# Patient Record
Sex: FEMALE | HISPANIC OR LATINO | Employment: FULL TIME | URBAN - METROPOLITAN AREA
[De-identification: names, ages, dates, MRNs, and addresses within clinical notes are randomized per-mention and may not be internally consistent; named-entity substitution may affect disease eponyms.]

---

## 2023-09-08 ENCOUNTER — HOSPITAL ENCOUNTER (EMERGENCY)
Facility: CLINIC | Age: 58
Discharge: HOME | End: 2023-09-08
Attending: EMERGENCY MEDICINE | Admitting: EMERGENCY MEDICINE
Payer: COMMERCIAL

## 2023-09-08 VITALS
DIASTOLIC BLOOD PRESSURE: 128 MMHG | HEART RATE: 77 BPM | RESPIRATION RATE: 20 BRPM | OXYGEN SATURATION: 98 % | SYSTOLIC BLOOD PRESSURE: 181 MMHG | TEMPERATURE: 97.6 F

## 2023-09-08 DIAGNOSIS — L03.116 CELLULITIS OF LEFT LOWER LEG: ICD-10-CM

## 2023-09-08 LAB
ANION GAP SERPL CALCULATED.3IONS-SCNC: 11 MMOL/L (ref 7–15)
BASOPHILS # BLD AUTO: 0 10E3/UL (ref 0–0.2)
BASOPHILS NFR BLD AUTO: 0 %
BUN SERPL-MCNC: 36 MG/DL (ref 6–20)
CALCIUM SERPL-MCNC: 9.1 MG/DL (ref 8.6–10)
CHLORIDE SERPL-SCNC: 100 MMOL/L (ref 98–107)
CREAT SERPL-MCNC: 1.49 MG/DL (ref 0.51–0.95)
DEPRECATED HCO3 PLAS-SCNC: 23 MMOL/L (ref 22–29)
EGFRCR SERPLBLD CKD-EPI 2021: 40 ML/MIN/1.73M2
EOSINOPHIL # BLD AUTO: 0.2 10E3/UL (ref 0–0.7)
EOSINOPHIL NFR BLD AUTO: 3 %
ERYTHROCYTE [DISTWIDTH] IN BLOOD BY AUTOMATED COUNT: 13.6 % (ref 10–15)
GLUCOSE SERPL-MCNC: 270 MG/DL (ref 70–99)
HCT VFR BLD AUTO: 40 % (ref 35–47)
HGB BLD-MCNC: 13 G/DL (ref 11.7–15.7)
IMM GRANULOCYTES # BLD: 0 10E3/UL
IMM GRANULOCYTES NFR BLD: 0 %
LYMPHOCYTES # BLD AUTO: 2.7 10E3/UL (ref 0.8–5.3)
LYMPHOCYTES NFR BLD AUTO: 34 %
MCH RBC QN AUTO: 28.1 PG (ref 26.5–33)
MCHC RBC AUTO-ENTMCNC: 32.5 G/DL (ref 31.5–36.5)
MCV RBC AUTO: 87 FL (ref 78–100)
MONOCYTES # BLD AUTO: 0.6 10E3/UL (ref 0–1.3)
MONOCYTES NFR BLD AUTO: 8 %
NEUTROPHILS # BLD AUTO: 4.3 10E3/UL (ref 1.6–8.3)
NEUTROPHILS NFR BLD AUTO: 55 %
NRBC # BLD AUTO: 0 10E3/UL
NRBC BLD AUTO-RTO: 0 /100
PLATELET # BLD AUTO: 264 10E3/UL (ref 150–450)
POTASSIUM SERPL-SCNC: 4.6 MMOL/L (ref 3.4–5.3)
RBC # BLD AUTO: 4.62 10E6/UL (ref 3.8–5.2)
SODIUM SERPL-SCNC: 134 MMOL/L (ref 136–145)
WBC # BLD AUTO: 8 10E3/UL (ref 4–11)

## 2023-09-08 PROCEDURE — 36415 COLL VENOUS BLD VENIPUNCTURE: CPT | Performed by: EMERGENCY MEDICINE

## 2023-09-08 PROCEDURE — 99284 EMERGENCY DEPT VISIT MOD MDM: CPT

## 2023-09-08 PROCEDURE — 85025 COMPLETE CBC W/AUTO DIFF WBC: CPT | Performed by: EMERGENCY MEDICINE

## 2023-09-08 PROCEDURE — 93005 ELECTROCARDIOGRAM TRACING: CPT

## 2023-09-08 PROCEDURE — 80048 BASIC METABOLIC PNL TOTAL CA: CPT | Performed by: EMERGENCY MEDICINE

## 2023-09-08 PROCEDURE — 250N000013 HC RX MED GY IP 250 OP 250 PS 637: Performed by: EMERGENCY MEDICINE

## 2023-09-08 RX ORDER — FUROSEMIDE 40 MG
40 TABLET ORAL ONCE
Status: COMPLETED | OUTPATIENT
Start: 2023-09-08 | End: 2023-09-08

## 2023-09-08 RX ORDER — FUROSEMIDE 20 MG
40 TABLET ORAL DAILY
Qty: 8 TABLET | Refills: 0 | Status: SHIPPED | OUTPATIENT
Start: 2023-09-08 | End: 2023-09-12

## 2023-09-08 RX ORDER — CEPHALEXIN 500 MG/1
500 CAPSULE ORAL 4 TIMES DAILY
Qty: 40 CAPSULE | Refills: 0 | Status: SHIPPED | OUTPATIENT
Start: 2023-09-08 | End: 2023-09-18

## 2023-09-08 RX ORDER — CEPHALEXIN 500 MG/1
500 CAPSULE ORAL ONCE
Status: COMPLETED | OUTPATIENT
Start: 2023-09-08 | End: 2023-09-08

## 2023-09-08 RX ADMIN — FUROSEMIDE 40 MG: 40 TABLET ORAL at 23:10

## 2023-09-08 RX ADMIN — CEPHALEXIN 500 MG: 500 CAPSULE ORAL at 23:10

## 2023-09-08 ASSESSMENT — ACTIVITIES OF DAILY LIVING (ADL): ADLS_ACUITY_SCORE: 33

## 2023-09-09 NOTE — ED PROVIDER NOTES
History     Chief Complaint:  Leg Swelling       HPI   Shana Rashid is a 58 year old female with a history of hypertension and diabetes who presents to the ED today with right lower leg pain and swelling. Patient reports that she has been having redness and swelling for 4 days over her left ankle which she at first believed to be a bug bite. Daughter also reports that she works sitting down which makes her leg swelling worse.  Denies history of leg infection, DVT history, chest pain, shortness of breath, history of stress test, fever.     Patient has history of hypertension.  She states that blood pressure is typically well controlled.  She has been taking her blood pressure medications as prescribed although has not taken her evening dose secondary to being in the emergency department.  Recent medication changes.  Patient states that she has had some chronic shortness of breath that occurs when she is climbing quite a few stairs.  However this is not new or progressive.  She has not had any chest pain or shortness of breath with walking her dog.  Incidentally, she denies chest pain or shortness of breath today.  No history of PE or DVT.    Independent Historian:   Daughter supplements as above and helps translate.  also used.    Review of External Notes:   None    Medications:    Losartan   Carvedilol  Jardiance      Past Medical History:    Hypertension   Diabetes     Past Surgical History:    Reproductive surgery   Ureteral stent placement   Cystoscopy with retrogrades rigid     Physical Exam   Patient Vitals for the past 24 hrs:   BP Temp Temp src Pulse Resp SpO2   09/08/23 2245 (!) 181/128 -- -- 77 20 98 %   09/08/23 2003 (!) 182/110 97.6  F (36.4  C) Temporal 77 18 97 %      Physical Exam    Gen: alert  HEENT: PERRL, oropharynx clear  Neck: normal ROM  CV: RRR, no murmurs, 2+ distal pulses in all 4 extremities  Chest: no tenderness over the chest wall  Pulm: breath sounds equal,  lungs clear  Abd: Soft, nontender  Back: no evidence of injury  MSK: no lower extremity edema, no calf tenderness  Skin: There is open area of scabbing to the proximal left lower leg, lower left shin red warm and tender consistent with cellulitis  Neuro: alert, appropriate conversation and interaction      Emergency Department Course   ECG  ECG taken at 2234, ECG read at 2236  Poor data quality, interpretation may be adversely affected  Normal sinus rhythm   Moderate voltage criteria for LVH, may be normal variant   Nonspecific T wave abnormality   Abnormal ECG    Rate 77 bpm. VA interval 156 ms. QRS duration 80 ms. QT/QTc 372/420 ms. P-R-T axes 55 -18 85.     Laboratory:  Labs Ordered and Resulted from Time of ED Arrival to Time of ED Departure   BASIC METABOLIC PANEL - Abnormal       Result Value    Sodium 134 (*)     Potassium 4.6      Chloride 100      Carbon Dioxide (CO2) 23      Anion Gap 11      Urea Nitrogen 36.0 (*)     Creatinine 1.49 (*)     Calcium 9.1      Glucose 270 (*)     GFR Estimate 40 (*)    CBC WITH PLATELETS AND DIFFERENTIAL    WBC Count 8.0      RBC Count 4.62      Hemoglobin 13.0      Hematocrit 40.0      MCV 87      MCH 28.1      MCHC 32.5      RDW 13.6      Platelet Count 264      % Neutrophils 55      % Lymphocytes 34      % Monocytes 8      % Eosinophils 3      % Basophils 0      % Immature Granulocytes 0      NRBCs per 100 WBC 0      Absolute Neutrophils 4.3      Absolute Lymphocytes 2.7      Absolute Monocytes 0.6      Absolute Eosinophils 0.2      Absolute Basophils 0.0      Absolute Immature Granulocytes 0.0      Absolute NRBCs 0.0      Emergency Department Course & Assessments:       Interventions:  Medications   furosemide (LASIX) tablet 40 mg (40 mg Oral $Given 9/8/23 2310)   cephALEXin (KEFLEX) capsule 500 mg (500 mg Oral $Given 9/8/23 2310)     Assessments:  2315 I obtained history and examined the patient as noted above.     Independent Interpretation (X-rays, CTs, rhythm  strip):  None    Consultations/Discussion of Management or Tests:  None        Social Determinants of Health affecting care:   None    Disposition:  The patient was discharged to home.     Impression & Plan    CMS Diagnoses: None    Medical Decision Making:  Patient presents for left lower extremity pain redness and swelling.  Exam is consistent with cellulitis.  There is clear wound of the proximal leg that was likely the initial nidus of infection.  No foot wound.  No fever.  I discussed with the patient the serious nature of cellulitis and diabetic patient.  We will treat with cephalexin.  She has no prior history of resistant skin infection.  Patient also with quite elevated blood pressure today.  Patient has a history of hypertension.  No overt evidence of new hypertensive emergency.  Patient does follow with nephrology for history of kidney dysfunction.  No chest pain or shortness of breath.  She notes that she has some chronic bilateral lower extremity edema and this seems to be a bit worse than baseline.  Currently, more swollen than her leg with cellulitis.  Given swelling as well as elevated blood pressure, will do 5-day burst of Lasix.  Elevate legs.  Take Keflex as prescribed.  Signs and symptoms for which return the emergency department were discussed.  Discharge home.  Close outpatient primary care follow-up to reassess blood pressure.  And follow-up cellulitis.  Discharge home    Diagnosis:    ICD-10-CM    1. Cellulitis of left lower leg  L03.116          Discharge Medications:  Discharge Medication List as of 9/8/2023 11:12 PM        START taking these medications    Details   cephALEXin (KEFLEX) 500 MG capsule Take 1 capsule (500 mg) by mouth 4 times daily for 10 days, Disp-40 capsule, R-0, E-Prescribe      furosemide (LASIX) 20 MG tablet Take 2 tablets (40 mg) by mouth daily for 4 days, Disp-8 tablet, R-0, E-Prescribe            Scribe Disclosure:  Josh VELAZQUEZ, am serving as a scribe at 12:03  AM on 9/9/2023 to document services personally performed by Janae Chavarria MD based on my observations and the provider's statements to me.     9/8/2023   Janae Chavarria MD Trussell, Kristi Jo Schneider, MD  09/09/23 0712

## 2023-09-09 NOTE — DISCHARGE INSTRUCTIONS
Elevated legs as much as possible.  Drink plenty of fluids.  Take antibiotics as directed.  Take lasix (water pill) as directed.

## 2023-09-09 NOTE — ED TRIAGE NOTES
Pt presents to the ED with left lower leg swelling and redness. Pt states it started 4 days ago. Pt HTN in triage 182/110.      Triage Assessment       Row Name 09/08/23 2004       Triage Assessment (Adult)    Airway WDL WDL       Respiratory WDL    Respiratory WDL WDL       Skin Circulation/Temperature WDL    Skin Circulation/Temperature WDL X  Left lower leg redness and swelling       Cardiac WDL    Cardiac WDL X  HTN       Peripheral/Neurovascular WDL    Peripheral Neurovascular WDL WDL       Cognitive/Neuro/Behavioral WDL    Cognitive/Neuro/Behavioral WDL WDL

## 2023-09-11 LAB
ATRIAL RATE - MUSE: 77 BPM
DIASTOLIC BLOOD PRESSURE - MUSE: NORMAL MMHG
INTERPRETATION ECG - MUSE: NORMAL
P AXIS - MUSE: 55 DEGREES
PR INTERVAL - MUSE: 156 MS
QRS DURATION - MUSE: 80 MS
QT - MUSE: 372 MS
QTC - MUSE: 420 MS
R AXIS - MUSE: -18 DEGREES
SYSTOLIC BLOOD PRESSURE - MUSE: NORMAL MMHG
T AXIS - MUSE: 85 DEGREES
VENTRICULAR RATE- MUSE: 77 BPM

## 2024-11-29 ENCOUNTER — HOSPITAL ENCOUNTER (EMERGENCY)
Facility: CLINIC | Age: 59
Discharge: HOME | End: 2024-11-29
Attending: EMERGENCY MEDICINE | Admitting: EMERGENCY MEDICINE
Payer: COMMERCIAL

## 2024-11-29 VITALS
HEART RATE: 68 BPM | TEMPERATURE: 96.8 F | HEIGHT: 61 IN | SYSTOLIC BLOOD PRESSURE: 138 MMHG | OXYGEN SATURATION: 98 % | WEIGHT: 213.41 LBS | RESPIRATION RATE: 16 BRPM | BODY MASS INDEX: 40.29 KG/M2 | DIASTOLIC BLOOD PRESSURE: 68 MMHG

## 2024-11-29 DIAGNOSIS — M86.9 FINGER OSTEOMYELITIS, RIGHT (H): ICD-10-CM

## 2024-11-29 LAB
ANION GAP SERPL CALCULATED.3IONS-SCNC: 14 MMOL/L (ref 7–15)
BASOPHILS # BLD AUTO: 0 10E3/UL (ref 0–0.2)
BASOPHILS NFR BLD AUTO: 0 %
BUN SERPL-MCNC: 42 MG/DL (ref 8–23)
CALCIUM SERPL-MCNC: 8.2 MG/DL (ref 8.8–10.4)
CHLORIDE SERPL-SCNC: 106 MMOL/L (ref 98–107)
CREAT SERPL-MCNC: 1.5 MG/DL (ref 0.51–0.95)
CRP SERPL-MCNC: 7.86 MG/L
EGFRCR SERPLBLD CKD-EPI 2021: 40 ML/MIN/1.73M2
EOSINOPHIL # BLD AUTO: 0.3 10E3/UL (ref 0–0.7)
EOSINOPHIL NFR BLD AUTO: 4 %
ERYTHROCYTE [DISTWIDTH] IN BLOOD BY AUTOMATED COUNT: 14.1 % (ref 10–15)
ERYTHROCYTE [SEDIMENTATION RATE] IN BLOOD BY WESTERGREN METHOD: 34 MM/HR (ref 0–30)
GLUCOSE SERPL-MCNC: 140 MG/DL (ref 70–99)
HCO3 SERPL-SCNC: 18 MMOL/L (ref 22–29)
HCT VFR BLD AUTO: 38.7 % (ref 35–47)
HGB BLD-MCNC: 12.4 G/DL (ref 11.7–15.7)
HOLD SPECIMEN: NORMAL
HOLD SPECIMEN: NORMAL
IMM GRANULOCYTES # BLD: 0 10E3/UL
IMM GRANULOCYTES NFR BLD: 0 %
LYMPHOCYTES # BLD AUTO: 2.7 10E3/UL (ref 0.8–5.3)
LYMPHOCYTES NFR BLD AUTO: 31 %
MCH RBC QN AUTO: 28.1 PG (ref 26.5–33)
MCHC RBC AUTO-ENTMCNC: 32 G/DL (ref 31.5–36.5)
MCV RBC AUTO: 88 FL (ref 78–100)
MONOCYTES # BLD AUTO: 0.6 10E3/UL (ref 0–1.3)
MONOCYTES NFR BLD AUTO: 6 %
NEUTROPHILS # BLD AUTO: 5.1 10E3/UL (ref 1.6–8.3)
NEUTROPHILS NFR BLD AUTO: 59 %
NRBC # BLD AUTO: 0 10E3/UL
NRBC BLD AUTO-RTO: 0 /100
PLATELET # BLD AUTO: 267 10E3/UL (ref 150–450)
POTASSIUM SERPL-SCNC: 4.3 MMOL/L (ref 3.4–5.3)
RBC # BLD AUTO: 4.42 10E6/UL (ref 3.8–5.2)
SODIUM SERPL-SCNC: 138 MMOL/L (ref 135–145)
WBC # BLD AUTO: 8.7 10E3/UL (ref 4–11)

## 2024-11-29 PROCEDURE — 250N000011 HC RX IP 250 OP 636: Mod: JZ | Performed by: EMERGENCY MEDICINE

## 2024-11-29 PROCEDURE — 36415 COLL VENOUS BLD VENIPUNCTURE: CPT | Performed by: EMERGENCY MEDICINE

## 2024-11-29 PROCEDURE — 85018 HEMOGLOBIN: CPT | Performed by: EMERGENCY MEDICINE

## 2024-11-29 PROCEDURE — 99284 EMERGENCY DEPT VISIT MOD MDM: CPT | Mod: 25

## 2024-11-29 PROCEDURE — 85652 RBC SED RATE AUTOMATED: CPT | Performed by: EMERGENCY MEDICINE

## 2024-11-29 PROCEDURE — 85004 AUTOMATED DIFF WBC COUNT: CPT | Performed by: EMERGENCY MEDICINE

## 2024-11-29 PROCEDURE — 85041 AUTOMATED RBC COUNT: CPT | Performed by: EMERGENCY MEDICINE

## 2024-11-29 PROCEDURE — 80048 BASIC METABOLIC PNL TOTAL CA: CPT | Performed by: EMERGENCY MEDICINE

## 2024-11-29 PROCEDURE — 96365 THER/PROPH/DIAG IV INF INIT: CPT

## 2024-11-29 PROCEDURE — 84132 ASSAY OF SERUM POTASSIUM: CPT | Performed by: EMERGENCY MEDICINE

## 2024-11-29 PROCEDURE — 86140 C-REACTIVE PROTEIN: CPT | Performed by: EMERGENCY MEDICINE

## 2024-11-29 RX ORDER — CLINDAMYCIN HYDROCHLORIDE 300 MG/1
300 CAPSULE ORAL 4 TIMES DAILY
Qty: 40 CAPSULE | Refills: 0 | Status: SHIPPED | OUTPATIENT
Start: 2024-11-29 | End: 2024-12-09

## 2024-11-29 RX ORDER — CLINDAMYCIN PHOSPHATE 900 MG/50ML
900 INJECTION, SOLUTION INTRAVENOUS EVERY 8 HOURS
Status: DISCONTINUED | OUTPATIENT
Start: 2024-11-29 | End: 2024-11-30 | Stop reason: HOSPADM

## 2024-11-29 RX ADMIN — CLINDAMYCIN PHOSPHATE 900 MG: 900 INJECTION, SOLUTION INTRAVENOUS at 22:41

## 2024-11-29 ASSESSMENT — COLUMBIA-SUICIDE SEVERITY RATING SCALE - C-SSRS
6. HAVE YOU EVER DONE ANYTHING, STARTED TO DO ANYTHING, OR PREPARED TO DO ANYTHING TO END YOUR LIFE?: NO
2. HAVE YOU ACTUALLY HAD ANY THOUGHTS OF KILLING YOURSELF IN THE PAST MONTH?: NO
1. IN THE PAST MONTH, HAVE YOU WISHED YOU WERE DEAD OR WISHED YOU COULD GO TO SLEEP AND NOT WAKE UP?: NO

## 2024-11-29 ASSESSMENT — ACTIVITIES OF DAILY LIVING (ADL)
ADLS_ACUITY_SCORE: 41

## 2024-11-30 NOTE — ED PROVIDER NOTES
"  Emergency Department Note      History of Present Illness     Chief Complaint   Wound Check        HPI   Shana Rashid is a 59 year old female with a history of diabetes type 2, hypertension, and CKD stage 3 who presents to the ER for wound check. Patient reports 2 months ago she burned the tip of her right 2nd finger.  It just never healed.  No drainage bleeding or pain.  Full function intact.  Just was worried about it so decided to get it checked.  Went to UC XR done and sent here.      Independent Historian   Family    Review of External Notes   UC and XR report    Past Medical History     Medical History and Problem List   Diabetes type 2   Myopia, bilateral   Osteoarthritis   CKD stage 3   Hypertension     Medications   Furosemide   Amlodipine   Atorvastatin   Carvedilol   Jardiance   Losartan     Surgical History   Tubal ligation  Ureteral stent placed   Cystoscopy  Physical Exam     Patient Vitals for the past 24 hrs:   BP Temp Temp src Pulse Resp SpO2 Height Weight   11/29/24 2352 138/68 -- -- 68 -- 98 % 1.549 m (5' 1\") --   11/29/24 2029 (!) 143/81 96.8  F (36  C) Temporal 74 16 100 % -- 96.8 kg (213 lb 6.5 oz)     Physical Exam  General: Patient is well appearing. No distress.  Head: Atraumatic.  Eyes: Conjunctivae and EOM are normal. No scleral icterus.  Neck: Normal range of motion. Neck supple.   Cardiovascular: Normal rate, regular rhythm, normal heart sounds and intact distal pulses.   Pulmonary/Chest: Breath sounds normal. No respiratory distress.  Abdominal: Soft. Bowel sounds are normal. No distension. No tenderness. No rebound or guarding.   Musculoskeletal: Normal range of motion.  Skin: Right 2nd finger distal tuft skin breakdown.  No bleeding or drainage.  Full function intact painlessly.     Diagnostics     Lab Results   Labs Ordered and Resulted from Time of ED Arrival to Time of ED Departure   CRP INFLAMMATION - Abnormal       Result Value    CRP Inflammation 7.86 (*)  "   ERYTHROCYTE SEDIMENTATION RATE AUTO - Abnormal    Erythrocyte Sedimentation Rate 34 (*)    BASIC METABOLIC PANEL - Abnormal    Sodium 138      Potassium 4.3      Chloride 106      Carbon Dioxide (CO2) 18 (*)     Anion Gap 14      Urea Nitrogen 42.0 (*)     Creatinine 1.50 (*)     GFR Estimate 40 (*)     Calcium 8.2 (*)     Glucose 140 (*)    CBC WITH PLATELETS AND DIFFERENTIAL    WBC Count 8.7      RBC Count 4.42      Hemoglobin 12.4      Hematocrit 38.7      MCV 88      MCH 28.1      MCHC 32.0      RDW 14.1      Platelet Count 267      % Neutrophils 59      % Lymphocytes 31      % Monocytes 6      % Eosinophils 4      % Basophils 0      % Immature Granulocytes 0      NRBCs per 100 WBC 0      Absolute Neutrophils 5.1      Absolute Lymphocytes 2.7      Absolute Monocytes 0.6      Absolute Eosinophils 0.3      Absolute Basophils 0.0      Absolute Immature Granulocytes 0.0      Absolute NRBCs 0.0         Imaging   No orders to display     Independent Interpretation   Right hand XR from clinic later loaded onto PACS and appreciate distal tuft erosion 2nd finger.      ED Course      Medications Administered   Medications - No data to display      Discussion of Management   Orthopedics on call for hand.      ED Course   ED Course as of 11/30/24 0224 Fri Nov 29, 2024 2202 I obtained the history and examined the patient as noted above.    2310 I rechecked patient and explained findings and plan of care.     2332 I rechecked patient and explained findings and plan of care.         Additional Documentation  None    Medical Decision Making / Diagnosis         JOHN   Shaanevelin Michael Rachid is a 59 year old female with now 2 months of nonhealing finger tip without acute redness swelling drainage or loss of function.  There was really no acute reasoning for tonight other than just timing of coming in.  We discussed need for admission IV abx and consultation she rather would not and would like to go home.  I discussed this  with on call ortho for hand and because now chronic not actively showing surgical signs they agreed to see in clinic early this coming week we will be sending home on abx and wound care instructed.  She understands some risk with this and return instructions.      Disposition   The patient was discharged.     Diagnosis     ICD-10-CM    1. Finger osteomyelitis, right (H)  M86.9            Discharge Medications   Discharge Medication List as of 11/29/2024 11:46 PM        START taking these medications    Details   clindamycin (CLEOCIN) 300 MG capsule Take 1 capsule (300 mg) by mouth 4 times daily for 10 days., Disp-40 capsule, R-0, Local Print               Scribe Disclosure:  I, Mihir Lawton, am serving as a scribe at 8:55 PM on 11/29/2024 to document services personally performed by Antonio Gardiner MD based on my observations and the provider's statements to me.        Antonio Gardiner MD  11/30/24 0227

## 2024-11-30 NOTE — ED TRIAGE NOTES
Burn to right pointer finger that occurred 2 months ago. Ongoing wound to the tip of her finger. XR completed today that shows possible osteomyelitis. Sent to ER for eval.

## 2025-02-10 ENCOUNTER — LAB REQUISITION (OUTPATIENT)
Dept: LAB | Facility: CLINIC | Age: 60
End: 2025-02-10
Payer: COMMERCIAL

## 2025-02-10 DIAGNOSIS — M86.9 OSTEOMYELITIS, UNSPECIFIED (H): ICD-10-CM

## 2025-02-10 PROCEDURE — 88342 IMHCHEM/IMCYTCHM 1ST ANTB: CPT | Mod: 26 | Performed by: PATHOLOGY

## 2025-02-10 PROCEDURE — 88307 TISSUE EXAM BY PATHOLOGIST: CPT | Mod: 26 | Performed by: PATHOLOGY

## 2025-02-10 PROCEDURE — 88342 IMHCHEM/IMCYTCHM 1ST ANTB: CPT | Mod: TC,ORL | Performed by: STUDENT IN AN ORGANIZED HEALTH CARE EDUCATION/TRAINING PROGRAM

## 2025-02-10 PROCEDURE — 88307 TISSUE EXAM BY PATHOLOGIST: CPT | Mod: TC,ORL | Performed by: STUDENT IN AN ORGANIZED HEALTH CARE EDUCATION/TRAINING PROGRAM

## 2025-02-10 PROCEDURE — 88311 DECALCIFY TISSUE: CPT | Mod: 26 | Performed by: PATHOLOGY

## 2025-02-12 LAB
PATH REPORT.COMMENTS IMP SPEC: NORMAL
PATH REPORT.FINAL DX SPEC: NORMAL
PATH REPORT.GROSS SPEC: NORMAL
PATH REPORT.MICROSCOPIC SPEC OTHER STN: NORMAL
PATH REPORT.RELEVANT HX SPEC: NORMAL
PHOTO IMAGE: NORMAL